# Patient Record
Sex: FEMALE | Race: WHITE | NOT HISPANIC OR LATINO | URBAN - METROPOLITAN AREA
[De-identification: names, ages, dates, MRNs, and addresses within clinical notes are randomized per-mention and may not be internally consistent; named-entity substitution may affect disease eponyms.]

---

## 2019-03-17 ENCOUNTER — OUTPATIENT (OUTPATIENT)
Dept: OUTPATIENT SERVICES | Facility: HOSPITAL | Age: 29
LOS: 1 days | End: 2019-03-17

## 2019-03-17 VITALS — SYSTOLIC BLOOD PRESSURE: 97 MMHG | HEART RATE: 88 BPM | TEMPERATURE: 98 F | DIASTOLIC BLOOD PRESSURE: 56 MMHG

## 2019-03-17 DIAGNOSIS — O26.899 OTHER SPECIFIED PREGNANCY RELATED CONDITIONS, UNSPECIFIED TRIMESTER: ICD-10-CM

## 2019-03-17 DIAGNOSIS — Z3A.00 WEEKS OF GESTATION OF PREGNANCY NOT SPECIFIED: ICD-10-CM

## 2019-03-17 LAB
APPEARANCE UR: CLEAR — SIGNIFICANT CHANGE UP
BACTERIA # UR AUTO: SIGNIFICANT CHANGE UP
BILIRUB UR-MCNC: NEGATIVE — SIGNIFICANT CHANGE UP
BLOOD UR QL VISUAL: NEGATIVE — SIGNIFICANT CHANGE UP
COLOR SPEC: SIGNIFICANT CHANGE UP
GLUCOSE UR-MCNC: NEGATIVE — SIGNIFICANT CHANGE UP
HYALINE CASTS # UR AUTO: NEGATIVE — SIGNIFICANT CHANGE UP
KETONES UR-MCNC: NEGATIVE — SIGNIFICANT CHANGE UP
LEUKOCYTE ESTERASE UR-ACNC: SIGNIFICANT CHANGE UP
NITRITE UR-MCNC: NEGATIVE — SIGNIFICANT CHANGE UP
PH UR: 7 — SIGNIFICANT CHANGE UP (ref 5–8)
PROT UR-MCNC: NEGATIVE — SIGNIFICANT CHANGE UP
RBC CASTS # UR COMP ASSIST: SIGNIFICANT CHANGE UP (ref 0–?)
SP GR SPEC: 1.01 — SIGNIFICANT CHANGE UP (ref 1–1.04)
SQUAMOUS # UR AUTO: SIGNIFICANT CHANGE UP
UROBILINOGEN FLD QL: NORMAL — SIGNIFICANT CHANGE UP
WBC UR QL: HIGH (ref 0–?)

## 2019-03-19 LAB
BACTERIA UR CULT: SIGNIFICANT CHANGE UP
SPECIMEN SOURCE: SIGNIFICANT CHANGE UP

## 2019-08-16 ENCOUNTER — OUTPATIENT (OUTPATIENT)
Dept: INPATIENT UNIT | Facility: HOSPITAL | Age: 29
LOS: 1 days | Discharge: HOME | End: 2019-08-16
Payer: MEDICAID

## 2019-08-16 VITALS — SYSTOLIC BLOOD PRESSURE: 107 MMHG | HEART RATE: 81 BPM | DIASTOLIC BLOOD PRESSURE: 56 MMHG

## 2019-08-16 DIAGNOSIS — Z98.82 BREAST IMPLANT STATUS: Chronic | ICD-10-CM

## 2019-08-16 DIAGNOSIS — Z3A.00 WEEKS OF GESTATION OF PREGNANCY NOT SPECIFIED: ICD-10-CM

## 2019-08-16 DIAGNOSIS — O26.899 OTHER SPECIFIED PREGNANCY RELATED CONDITIONS, UNSPECIFIED TRIMESTER: ICD-10-CM

## 2019-08-16 PROCEDURE — 99213 OFFICE O/P EST LOW 20 MIN: CPT

## 2019-08-16 NOTE — OB PROVIDER TRIAGE NOTE - NSOBPROVIDERNOTE_OBGYN_ALL_OB_FT
d/w Dr. Brown  Discharge pt to walk for 2-3. Return to triage sooner if contractions worsen, water breaks, vaginal bleeding or decreased fetal movement.

## 2019-08-16 NOTE — OB PROVIDER TRIAGE NOTE - NSHPPHYSICALEXAM_GEN_ALL_CORE
Vital Signs Last 24 Hrs  T(C): 36.5 (16 Aug 2019 22:43), Max: 36.5 (16 Aug 2019 22:43)  T(F): 97.7 (16 Aug 2019 22:43), Max: 97.7 (16 Aug 2019 22:43)  HR: 82 (16 Aug 2019 22:43) (82 - 82)  BP: 114/60 (16 Aug 2019 22:43) (114/60 - 114/60)  RR: 18 (16 Aug 2019 22:43) (18 - 18)    SVE 3/80/-3    Category 1 tracing   ctx q 2-6 min Vital Signs Last 24 Hrs  T(C): 36.5 (16 Aug 2019 22:43), Max: 36.5 (16 Aug 2019 22:43)  T(F): 97.7 (16 Aug 2019 22:43), Max: 97.7 (16 Aug 2019 22:43)  HR: 82 (16 Aug 2019 22:43) (82 - 82)  BP: 114/60 (16 Aug 2019 22:43) (114/60 - 114/60)  RR: 18 (16 Aug 2019 22:43) (18 - 18)    SVE 3/80/-3    Category 1 tracing   ctx q 2-6 min    TAUS cephalic presentation, anterior placenta, benedict 8.04 cm, bpp

## 2019-08-16 NOTE — OB PROVIDER TRIAGE NOTE - ADDITIONAL INSTRUCTIONS
Discharge pt to walk for 2-3. Return to triage sooner if contractions worsen, water breaks, vaginal bleeding or decreased fetal movement.

## 2019-08-17 ENCOUNTER — TRANSCRIPTION ENCOUNTER (OUTPATIENT)
Age: 29
End: 2019-08-17

## 2019-08-17 ENCOUNTER — INPATIENT (INPATIENT)
Facility: HOSPITAL | Age: 29
LOS: 1 days | Discharge: HOME | End: 2019-08-19
Attending: OBSTETRICS & GYNECOLOGY | Admitting: OBSTETRICS & GYNECOLOGY
Payer: COMMERCIAL

## 2019-08-17 VITALS
HEART RATE: 73 BPM | DIASTOLIC BLOOD PRESSURE: 61 MMHG | SYSTOLIC BLOOD PRESSURE: 98 MMHG | RESPIRATION RATE: 16 BRPM | OXYGEN SATURATION: 100 % | TEMPERATURE: 98 F

## 2019-08-17 DIAGNOSIS — O41.03X0 OLIGOHYDRAMNIOS, THIRD TRIMESTER, NOT APPLICABLE OR UNSPECIFIED: ICD-10-CM

## 2019-08-17 DIAGNOSIS — Z98.82 BREAST IMPLANT STATUS: Chronic | ICD-10-CM

## 2019-08-17 DIAGNOSIS — O26.899 OTHER SPECIFIED PREGNANCY RELATED CONDITIONS, UNSPECIFIED TRIMESTER: ICD-10-CM

## 2019-08-17 DIAGNOSIS — Z3A.00 WEEKS OF GESTATION OF PREGNANCY NOT SPECIFIED: ICD-10-CM

## 2019-08-17 LAB
BASOPHILS # BLD AUTO: 0.03 K/UL — SIGNIFICANT CHANGE UP (ref 0–0.2)
BASOPHILS NFR BLD AUTO: 0.3 % — SIGNIFICANT CHANGE UP (ref 0–2)
BLD GP AB SCN SERPL QL: NEGATIVE — SIGNIFICANT CHANGE UP
EOSINOPHIL # BLD AUTO: 0.13 K/UL — SIGNIFICANT CHANGE UP (ref 0–0.5)
EOSINOPHIL NFR BLD AUTO: 1.1 % — SIGNIFICANT CHANGE UP (ref 0–6)
HCT VFR BLD CALC: 36.1 % — SIGNIFICANT CHANGE UP (ref 34.5–45)
HGB BLD-MCNC: 11.4 G/DL — LOW (ref 11.5–15.5)
IMM GRANULOCYTES NFR BLD AUTO: 0.7 % — SIGNIFICANT CHANGE UP (ref 0–1.5)
LYMPHOCYTES # BLD AUTO: 1.87 K/UL — SIGNIFICANT CHANGE UP (ref 1–3.3)
LYMPHOCYTES # BLD AUTO: 16.2 % — SIGNIFICANT CHANGE UP (ref 13–44)
MCHC RBC-ENTMCNC: 28.4 PG — SIGNIFICANT CHANGE UP (ref 27–34)
MCHC RBC-ENTMCNC: 31.6 % — LOW (ref 32–36)
MCV RBC AUTO: 90 FL — SIGNIFICANT CHANGE UP (ref 80–100)
MONOCYTES # BLD AUTO: 0.92 K/UL — HIGH (ref 0–0.9)
MONOCYTES NFR BLD AUTO: 8 % — SIGNIFICANT CHANGE UP (ref 2–14)
NEUTROPHILS # BLD AUTO: 8.52 K/UL — HIGH (ref 1.8–7.4)
NEUTROPHILS NFR BLD AUTO: 73.7 % — SIGNIFICANT CHANGE UP (ref 43–77)
NRBC # FLD: 0 K/UL — SIGNIFICANT CHANGE UP (ref 0–0)
PLATELET # BLD AUTO: 184 K/UL — SIGNIFICANT CHANGE UP (ref 150–400)
PMV BLD: 11.9 FL — SIGNIFICANT CHANGE UP (ref 7–13)
RBC # BLD: 4.01 M/UL — SIGNIFICANT CHANGE UP (ref 3.8–5.2)
RBC # FLD: 14.6 % — HIGH (ref 10.3–14.5)
RH IG SCN BLD-IMP: POSITIVE — SIGNIFICANT CHANGE UP
WBC # BLD: 11.55 K/UL — HIGH (ref 3.8–10.5)
WBC # FLD AUTO: 11.55 K/UL — HIGH (ref 3.8–10.5)

## 2019-08-17 PROCEDURE — 59409 OBSTETRICAL CARE: CPT | Mod: U9

## 2019-08-17 RX ORDER — CITRIC ACID/SODIUM CITRATE 300-500 MG
15 SOLUTION, ORAL ORAL EVERY 6 HOURS
Refills: 0 | Status: DISCONTINUED | OUTPATIENT
Start: 2019-08-17 | End: 2019-08-17

## 2019-08-17 RX ORDER — MAGNESIUM HYDROXIDE 400 MG/1
30 TABLET, CHEWABLE ORAL
Refills: 0 | Status: DISCONTINUED | OUTPATIENT
Start: 2019-08-17 | End: 2019-08-19

## 2019-08-17 RX ORDER — ACETAMINOPHEN 500 MG
3 TABLET ORAL
Qty: 0 | Refills: 0 | DISCHARGE
Start: 2019-08-17

## 2019-08-17 RX ORDER — TETANUS TOXOID, REDUCED DIPHTHERIA TOXOID AND ACELLULAR PERTUSSIS VACCINE, ADSORBED 5; 2.5; 8; 8; 2.5 [IU]/.5ML; [IU]/.5ML; UG/.5ML; UG/.5ML; UG/.5ML
0.5 SUSPENSION INTRAMUSCULAR ONCE
Refills: 0 | Status: DISCONTINUED | OUTPATIENT
Start: 2019-08-17 | End: 2019-08-19

## 2019-08-17 RX ORDER — DIBUCAINE 1 %
1 OINTMENT (GRAM) RECTAL EVERY 6 HOURS
Refills: 0 | Status: DISCONTINUED | OUTPATIENT
Start: 2019-08-17 | End: 2019-08-19

## 2019-08-17 RX ORDER — OXYCODONE HYDROCHLORIDE 5 MG/1
5 TABLET ORAL
Refills: 0 | Status: DISCONTINUED | OUTPATIENT
Start: 2019-08-17 | End: 2019-08-19

## 2019-08-17 RX ORDER — SIMETHICONE 80 MG/1
80 TABLET, CHEWABLE ORAL EVERY 4 HOURS
Refills: 0 | Status: DISCONTINUED | OUTPATIENT
Start: 2019-08-17 | End: 2019-08-19

## 2019-08-17 RX ORDER — AER TRAVELER 0.5 G/1
1 SOLUTION RECTAL; TOPICAL EVERY 4 HOURS
Refills: 0 | Status: DISCONTINUED | OUTPATIENT
Start: 2019-08-17 | End: 2019-08-19

## 2019-08-17 RX ORDER — IBUPROFEN 200 MG
1 TABLET ORAL
Qty: 0 | Refills: 0 | DISCHARGE
Start: 2019-08-17

## 2019-08-17 RX ORDER — OXYCODONE HYDROCHLORIDE 5 MG/1
5 TABLET ORAL ONCE
Refills: 0 | Status: DISCONTINUED | OUTPATIENT
Start: 2019-08-17 | End: 2019-08-19

## 2019-08-17 RX ORDER — DOCUSATE SODIUM 100 MG
100 CAPSULE ORAL
Refills: 0 | Status: DISCONTINUED | OUTPATIENT
Start: 2019-08-17 | End: 2019-08-19

## 2019-08-17 RX ORDER — IBUPROFEN 200 MG
600 TABLET ORAL EVERY 6 HOURS
Refills: 0 | Status: COMPLETED | OUTPATIENT
Start: 2019-08-17 | End: 2020-07-15

## 2019-08-17 RX ORDER — SODIUM CHLORIDE 9 MG/ML
3 INJECTION INTRAMUSCULAR; INTRAVENOUS; SUBCUTANEOUS EVERY 8 HOURS
Refills: 0 | Status: DISCONTINUED | OUTPATIENT
Start: 2019-08-17 | End: 2019-08-19

## 2019-08-17 RX ORDER — SODIUM CHLORIDE 9 MG/ML
1000 INJECTION, SOLUTION INTRAVENOUS
Refills: 0 | Status: DISCONTINUED | OUTPATIENT
Start: 2019-08-17 | End: 2019-08-17

## 2019-08-17 RX ORDER — ACETAMINOPHEN 500 MG
975 TABLET ORAL
Refills: 0 | Status: DISCONTINUED | OUTPATIENT
Start: 2019-08-17 | End: 2019-08-19

## 2019-08-17 RX ORDER — HYDROCORTISONE 1 %
1 OINTMENT (GRAM) TOPICAL EVERY 6 HOURS
Refills: 0 | Status: DISCONTINUED | OUTPATIENT
Start: 2019-08-17 | End: 2019-08-19

## 2019-08-17 RX ORDER — OXYTOCIN 10 UNIT/ML
2 VIAL (ML) INJECTION
Qty: 30 | Refills: 0 | Status: DISCONTINUED | OUTPATIENT
Start: 2019-08-17 | End: 2019-08-17

## 2019-08-17 RX ORDER — GLYCERIN ADULT
1 SUPPOSITORY, RECTAL RECTAL AT BEDTIME
Refills: 0 | Status: DISCONTINUED | OUTPATIENT
Start: 2019-08-17 | End: 2019-08-19

## 2019-08-17 RX ORDER — PRAMOXINE HYDROCHLORIDE 150 MG/15G
1 AEROSOL, FOAM RECTAL EVERY 4 HOURS
Refills: 0 | Status: DISCONTINUED | OUTPATIENT
Start: 2019-08-17 | End: 2019-08-19

## 2019-08-17 RX ORDER — BENZOCAINE 10 %
1 GEL (GRAM) MUCOUS MEMBRANE EVERY 6 HOURS
Refills: 0 | Status: DISCONTINUED | OUTPATIENT
Start: 2019-08-17 | End: 2019-08-19

## 2019-08-17 RX ORDER — DIPHENHYDRAMINE HCL 50 MG
25 CAPSULE ORAL EVERY 6 HOURS
Refills: 0 | Status: DISCONTINUED | OUTPATIENT
Start: 2019-08-17 | End: 2019-08-19

## 2019-08-17 RX ORDER — KETOROLAC TROMETHAMINE 30 MG/ML
30 SYRINGE (ML) INJECTION ONCE
Refills: 0 | Status: DISCONTINUED | OUTPATIENT
Start: 2019-08-17 | End: 2019-08-17

## 2019-08-17 RX ORDER — OXYTOCIN 10 UNIT/ML
333.33 VIAL (ML) INJECTION
Qty: 20 | Refills: 0 | Status: DISCONTINUED | OUTPATIENT
Start: 2019-08-17 | End: 2019-08-17

## 2019-08-17 RX ORDER — LANOLIN
1 OINTMENT (GRAM) TOPICAL EVERY 6 HOURS
Refills: 0 | Status: DISCONTINUED | OUTPATIENT
Start: 2019-08-17 | End: 2019-08-19

## 2019-08-17 RX ADMIN — Medication 2 MILLIUNIT(S)/MIN: at 15:14

## 2019-08-17 RX ADMIN — Medication 1000 MILLIUNIT(S)/MIN: at 18:52

## 2019-08-17 RX ADMIN — Medication 2 MILLIUNIT(S)/MIN: at 18:53

## 2019-08-17 NOTE — DISCHARGE NOTE OB - MEDICATION SUMMARY - MEDICATIONS TO TAKE
I will START or STAY ON the medications listed below when I get home from the hospital:    acetaminophen 325 mg oral tablet  -- 3 tab(s) by mouth   -- Indication: For pain    ibuprofen 600 mg oral tablet  -- 1 tab(s) by mouth every 6 hours  -- Indication: For pain I will START or STAY ON the medications listed below when I get home from the hospital:    ibuprofen 600 mg oral tablet  -- 1 tab(s) by mouth every 6 hours, As Needed  -- Indication: For pain    acetaminophen 325 mg oral tablet  -- 3 tab(s) by mouth , As Needed  -- Indication: For pain

## 2019-08-17 NOTE — OB PROVIDER H&P - ASSESSMENT
29 yo , EGA@40.2 weeks came back after walking; patient was evlaauted last night and was discharged with VE: 3 cm.  Prenatal course is uncomplicated.  OB hx: 2 , uncomplicated; biggest baby [id number]  Med hx: denies  Surg hx: 2018, breat augmentation  GYN hx: denies  meds: PNV  Allergy: NKDA  Upon evaluation, Vital Signs: T(C): 36.6 (17 Aug 2019 06:11), HR: 65 (17 Aug 2019 06:26) (65 - 82); BP: 105/61 (17 Aug 2019 06:26) (98/61 - 114/60); RR: 16 (17 Aug 2019 06:11) (16 - 18); SpO2: 100%  category 1 FHTs, irregular contractions patter; SVE: 2-3 cm/50/-2; BPP 2/8, PETTY 2; EFW 3300 g  A: 29 yo , EGA@40.2 weeks, oligohydramnios; category 1 FHTs  P: case was reviewed with Dr Martinez, was advised to admit for vaginal Cytotec; anticipate ; patient can ambulate in room after reactive tracing

## 2019-08-17 NOTE — CHART NOTE - NSCHARTNOTEFT_GEN_A_CORE
R4 OB Note    Vaginal cytotec 25 mcg placed.   SVE 2.5/60/-3  posterior, intermeidate tone.   Category 1 tracing    For pitocin in 4 hours for augmentation if needed.     Continous EFM/[place]    Mandie Gallegos PGy-4  d/w Dr. Martinez

## 2019-08-17 NOTE — DISCHARGE NOTE OB - CARE PROVIDERS DIRECT ADDRESSES
[email or web address]

## 2019-08-17 NOTE — OB PROVIDER DELIVERY SUMMARY - NSPROVIDERDELIVERYNOTE_OBGYN_ALL_OB_FT
Patient fully dilated and pushing  Delivery of liveborn male infant from OA position  Placenta delivered spontaneously and intact  Rectal cytotec 1000mcg given for JAMES atony, resolved with uterine massage  No lacerations noted    EBL 400ml

## 2019-08-17 NOTE — OB PROVIDER H&P - ATTENDING COMMENTS
Reviewed H&P as above. P2 admitted for IOL for oligohydramnios with vaginal cytotec. Approved for epidural.    MARY Sutton MD

## 2019-08-17 NOTE — DISCHARGE NOTE OB - PATIENT PORTAL LINK FT
You can access the [facility] Patient Portal, offered by [facility], by registering with the following website: [email or web address]

## 2019-08-17 NOTE — CHART NOTE - NSCHARTNOTEFT_GEN_A_CORE
OB Attending Progress Note    Patient seen and evaluated at bedside.  Denies complaints.  s/p SROM feeling some pressure with contractions    Vital Signs Last 24 Hrs  T(C): 36.8 (17 Aug 2019 13:34), Max: 36.8 (17 Aug 2019 13:34)  T(F): 98.24 (17 Aug 2019 13:34), Max: 98.24 (17 Aug 2019 13:34)  HR: 86 (17 Aug 2019 17:58) (65 - 103)  BP: 125/62 (17 Aug 2019 17:48) (85/50 - 125/62)  RR: 16 (17 Aug 2019 08:04) (16 - 18)  SpO2: 89% (17 Aug 2019 18:02) (82% - 100%)    SVE: 5/90/-3    EFM: 130, mod farhad, +acels, no decels  [place]:  ctx q 2-3 mins    A/P 35y P2 admitted for IOL for oligohydramnios  -Labor: pitocin at 4 mu/min, continue to titrate, peanut ball  -Fetal Status: reassuring  -GBS: negative  -Analgesia: epidural in place, anesthesia called for top off    N MD Herman

## 2019-08-17 NOTE — CHART NOTE - NSCHARTNOTEFT_GEN_A_CORE
PA Note    patient seen & examined for cont of management. comfortable with epidural    VS  T(C): 36.8 (08-17-19 @ 13:34)  HR: 80 (08-17-19 @ 14:53)  BP: 112/53 (08-17-19 @ 14:48)  RR: 16 (08-17-19 @ 08:04)  SpO2: 99% (08-17-19 @ 14:53)    130/mod farhad/+accels/no decels  [place] q 4-5min  3.5/70/-2    cont efm/toco  pitocin for continued augmentation  dw Dr Martinez, Dr Parsons SVC covering pitocin  ldevmauricio nelson

## 2019-08-17 NOTE — DISCHARGE NOTE OB - MATERIALS PROVIDED
Bottle Feeding Log/[facility] Hearing Screen Program/Birth Certificate Instructions/Discharge Medication Information for Patients and Families Pocket Guide/Breastfeeding Log/Back To Sleep Handout/Shaken Baby Prevention Handout/Guide to Postpartum Care/[facility] [place] Screening Program/[place]  Immunization Record

## 2019-08-17 NOTE — CHART NOTE - NSCHARTNOTEFT_GEN_A_CORE
R1 Progress Note    Pt checked for increased discomfort with ctx. Received vcyto #1 at 10:45a    Vital Signs Last 24 Hrs  T(C): 36.6 (17 Aug 2019 08:04), Max: 36.6 (17 Aug 2019 06:11)  T(F): 97.9 (17 Aug 2019 08:04), Max: 97.9 (17 Aug 2019 06:11)  HR: 75 (17 Aug 2019 10:39) (65 - 82)  BP: 114/57 (17 Aug 2019 10:39) (98/61 - 114/60)  RR: 16 (17 Aug 2019 08:04) (16 - 18)  SpO2: 100% (17 Aug 2019 06:11) (100% - 100%)    /mod/+accel/-decel  [place] q5min  VE 3/70/-3    4epi  VE @1:45pm -> ?pit    D/w Dr. Juan Palmer PGY-1

## 2019-08-17 NOTE — OB PROVIDER TRIAGE NOTE - NSOBPROVIDERNOTE_OBGYN_ALL_OB_FT
29 yo , EGA@40.2 weeks came back after walking; patient was evlaauted last night and was discharged with VE: 3 cm.  Prenatal course is uncomplicated.  OB hx: 2 , uncomplicated; biggest baby [id number]  Med hx: denies  Surg hx: 2018, breat augmentation  GYN hx: denies  meds: PNV  Allergy: NKDA  Upon evaluation, Vital Signs: T(C): 36.6 (17 Aug 2019 06:11), HR: 65 (17 Aug 2019 06:26) (65 - 82); BP: 105/61 (17 Aug 2019 06:26) (98/61 - 114/60); RR: 16 (17 Aug 2019 06:11) (16 - 18); SpO2: 100%  category 1 FHTs, irregular contractions patter; SVE: 2-3 cm/50/-2; BPP 8/8; EFW 3300 g  A: 29 yo , EGA@40.2 weeks, early labor vs false labor; category 1 FHTs  P: case was reviewed with Dr Martinez, was advised to discharge patient home with labor precautions, fetal movements count; if not in labor, to follow up with Dr Kwan on 2019. 29 yo , EGA@40.2 weeks came back after walking; patient was evlaauted last night and was discharged with VE: 3 cm.  Prenatal course is uncomplicated.  OB hx: 2 , uncomplicated; biggest baby [id number]  Med hx: denies  Surg hx: 2018, breat augmentation  GYN hx: denies  meds: PNV  Allergy: NKDA  Upon evaluation, Vital Signs: T(C): 36.6 (17 Aug 2019 06:11), HR: 65 (17 Aug 2019 06:26) (65 - 82); BP: 105/61 (17 Aug 2019 06:26) (98/61 - 114/60); RR: 16 (17 Aug 2019 06:11) (16 - 18); SpO2: 100%  category 1 FHTs, irregular contractions patter; SVE: 2-3 cm/50/-2; BPP 2/8, PETTY 2; EFW 3300 g  A: 29 yo , EGA@40.2 weeks, oligohydramnios; category 1 FHTs  P: case was reviewed with Dr Martinez, was advised to admit for vaginal Cytotec; anticipate ; patient can ambulate in room after reactive tracing

## 2019-08-17 NOTE — DISCHARGE NOTE OB - CARE PROVIDER_API CALL
Cristobal Kwan)  MaternalFetal Medicine; Obstetrics and Gynecology  [address] [address] [place] [address], [address]  [place] [address]  Phone: [phone]  Fax: [phone]  Follow Up Time:

## 2019-08-18 LAB — T PALLIDUM AB TITR SER: NEGATIVE — SIGNIFICANT CHANGE UP

## 2019-08-18 RX ORDER — IBUPROFEN 200 MG
600 TABLET ORAL EVERY 6 HOURS
Refills: 0 | Status: DISCONTINUED | OUTPATIENT
Start: 2019-08-18 | End: 2019-08-19

## 2019-08-18 RX ADMIN — Medication 600 MILLIGRAM(S): at 05:36

## 2019-08-18 RX ADMIN — SODIUM CHLORIDE 3 MILLILITER(S): 9 INJECTION INTRAMUSCULAR; INTRAVENOUS; SUBCUTANEOUS at 05:36

## 2019-08-18 RX ADMIN — SODIUM CHLORIDE 3 MILLILITER(S): 9 INJECTION INTRAMUSCULAR; INTRAVENOUS; SUBCUTANEOUS at 16:10

## 2019-08-18 NOTE — PROGRESS NOTE ADULT - SUBJECTIVE AND OBJECTIVE BOX
S: Patient doing well. No complaints. Minimal lochia. Pain controlled.    O: Vital Signs Last 24 Hrs  T(C): 36.7 (18 Aug 2019 05:30), Max: 37.1 (17 Aug 2019 18:45)  T(F): 98.1 (18 Aug 2019 05:30), Max: 98.8 (17 Aug 2019 18:45)  HR: 68 (18 Aug 2019 05:30) (68 - 116)  BP: 90/50 (18 Aug 2019 05:30) (85/50 - 139/64)  BP(mean): --  RR: 16 (18 Aug 2019 05:30) (16 - 16)  SpO2: 99% (18 Aug 2019 05:30) (79% - 100%)    Gen: NAD  Abd: soft, Nontender, Nondistended, fundus firm  Ext: no tendern, mild edema    Labs:                        11.4   11.55 )-----------( 184      ( 17 Aug 2019 07:58 )             36.1       A: 28y PPD# s/p  doing well.    Plan:  Routine postpartum care  Encouraged out of bed  Regular diet

## 2019-08-18 NOTE — PROGRESS NOTE ADULT - SUBJECTIVE AND OBJECTIVE BOX
post epidural d/c followup:    pt states freely voiding and ambulating.  Denies headeache/nausea/vomiting.  VSS.  States no concerns at this time      ICU Vital Signs Last 24 Hrs  T(C): 36.7 (18 Aug 2019 05:30), Max: 37.1 (17 Aug 2019 18:45)  T(F): 98.1 (18 Aug 2019 05:30), Max: 98.8 (17 Aug 2019 18:45)  HR: 68 (18 Aug 2019 05:30) (68 - 116)  BP: 90/50 (18 Aug 2019 05:30) (85/50 - 139/64)  BP(mean): --  ABP: --  ABP(mean): --  RR: 16 (18 Aug 2019 05:30) (16 - 16)  SpO2: 99% (18 Aug 2019 05:30) (79% - 100%)

## 2019-08-19 VITALS
OXYGEN SATURATION: 100 % | HEART RATE: 63 BPM | DIASTOLIC BLOOD PRESSURE: 61 MMHG | SYSTOLIC BLOOD PRESSURE: 104 MMHG | TEMPERATURE: 98 F | RESPIRATION RATE: 18 BRPM

## 2019-10-10 ENCOUNTER — APPOINTMENT (OUTPATIENT)
Dept: OBGYN | Facility: CLINIC | Age: 29
End: 2019-10-10

## 2021-02-05 NOTE — DISCHARGE NOTE OB - CARE PLAN
Class II - visualization of the soft palate, fauces, and uvula Principal Discharge DX:	 (normal spontaneous vaginal delivery)  Goal:	Home  Assessment and plan of treatment:	routine post partum care

## 2022-11-23 ENCOUNTER — NON-APPOINTMENT (OUTPATIENT)
Age: 32
End: 2022-11-23

## 2023-03-11 NOTE — OB PROVIDER TRIAGE NOTE - NSPRENATALCARE_OBGYN_ALL_OB
[Disease:__________________________] : Disease: [unfilled] [de-identified] : Ms. Langford is a 66 yo woman with newly diagnosed nodular lymphocyte predominant Hodgkin lymphoma. Around mid May, 2022, her dentist\par noticed a painless left neck lump. U/S 8/2022 showed a 2.3 cm mainly cystic mass inferior the the left parotid gland.\par CT of neck w/contrast on 9/13/22 demonstrated the mass to measure 2.2 cm, and was lobulated. There were several \par suspicious level IIa cervical nodes seen as well an indeterminate 16 mm node at that level. Nonspecific enlargement of\par palatine tonsils was seen, likely reactive. An FNA, then a core biopsy of the infraparotid node were non-diagnostic, \par with lymphoid atypia noted in the FNA. The mass was excised on 2/24/23, and it showed nodular, lymphocyte predominant HL, \par Neoplastic tumor cells expressed CD20, PAX5, CD79a, OCT-2, BENITEZ-1, BCL6, bright MUM1, CD45, BCL2, partial C-MYC and were\par (-) for CD15, BREE and p53. Polytypic plasma cells were seen. Focal areas of small groupings of large B cells (+) for \par CD30 and BREE were seen. Monoclonal B cell population was not identified by flow.\par She has no constitutional c/o.\par She has a h/o T3N0 colon cancer resected in 2018 at [facility] (Dr. IAN Mathis). She did not require adjuvant therapy. \par She had CT colography 5/2022 which was done b/o incomplete colonoscopy: this showed no LAD or H/S megaly; GGOswere seen in the\par lung bases c/w Covid infection. 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 INA Marquez), and had several yrs of negative brain imaging after surgery. 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 Mathis \par \par \par  [de-identified] : pending Yes

## 2023-04-13 NOTE — OB PROVIDER TRIAGE NOTE - NS_SONONOTE_OBGYN_ALL_OB_FT
Discharge Instruction for Undelivered Patients      You were seen for:  Pelvic Pain  We Consulted: Dr. Mendez  You had (Test or Medicine): NST, UA/UC, Wet prep     Diet:   Drink 8 to 12 glasses of liquids (milk, juice, water) every day.  You may eat meals and snacks.     Activity:  Rest the pelvic area. No sex. Do not stimulate breasts or nipples.  Stay on bed rest or partial bed rest. This means taking it easy and rest pelvis.    Stay off work rest of week.     Call your provider if you notice:  Swelling in your face or increased swelling in your hands or legs.  Headaches that are not relieved by Tylenol (acetaminophen).  Changes in your vision (blurring: seeing spots or stars.)  Nausea (sick to your stomach) and vomiting (throwing up).   Weight gain of 5 pounds or more per week.  Heartburn that doesn't go away.  Signs of bladder infection: pain when you urinate (use the toilet), need to go more often and more urgently.  The bag of caraballo (rupture of membranes) breaks, or you notice leaking in your underwear.  Bright red blood in your underwear.  Abdominal (lower belly) or stomach pain.  For first baby: Contractions (tightening) less than 5 minutes apart for one hour or more.  Increase or change in vaginal discharge (note the color and amount)      Follow-up:  As scheduled in the clinic on friday          
bpp 8/8

## 2024-05-10 NOTE — OB PROVIDER TRIAGE NOTE - NSWEIGHT2_OBGYN_ALL_OB_NU
Placed call to pt no answer. Left message  on pt vm to call back regarding her px on Tuesday 5/14/24. Telephone number provided.    [phone]

## 2024-09-19 ENCOUNTER — EMERGENCY (EMERGENCY)
Facility: HOSPITAL | Age: 34
LOS: 1 days | Discharge: HOME | End: 2024-09-19
Attending: STUDENT IN AN ORGANIZED HEALTH CARE EDUCATION/TRAINING PROGRAM
Payer: MEDICAID

## 2024-09-19 ENCOUNTER — TRANSCRIPTION ENCOUNTER (OUTPATIENT)
Age: 34
End: 2024-09-19

## 2024-09-19 VITALS
WEIGHT: 100.09 LBS | RESPIRATION RATE: 18 BRPM | TEMPERATURE: 98 F | HEIGHT: 62 IN | OXYGEN SATURATION: 100 % | DIASTOLIC BLOOD PRESSURE: 65 MMHG | HEART RATE: 84 BPM | SYSTOLIC BLOOD PRESSURE: 107 MMHG

## 2024-09-19 DIAGNOSIS — Z98.82 BREAST IMPLANT STATUS: Chronic | ICD-10-CM

## 2024-09-19 PROCEDURE — 99283 EMERGENCY DEPT VISIT LOW MDM: CPT

## 2024-09-19 PROCEDURE — 99284 EMERGENCY DEPT VISIT MOD MDM: CPT

## 2024-09-19 RX ORDER — ONDANSETRON 2 MG/ML
4 INJECTION, SOLUTION INTRAMUSCULAR; INTRAVENOUS ONCE
Refills: 0 | Status: COMPLETED | OUTPATIENT
Start: 2024-09-19 | End: 2024-09-19

## 2024-09-19 RX ORDER — METOCLOPRAMIDE HCL 5 MG
1 TABLET ORAL
Qty: 28 | Refills: 0
Start: 2024-09-19 | End: 2024-09-25

## 2024-09-19 RX ORDER — SUCRALFATE 1 G/10ML
1 SUSPENSION ORAL ONCE
Refills: 0 | Status: DISCONTINUED | OUTPATIENT
Start: 2024-09-19 | End: 2024-09-19

## 2024-09-19 RX ORDER — METOCLOPRAMIDE HCL 5 MG
10 TABLET ORAL ONCE
Refills: 0 | Status: COMPLETED | OUTPATIENT
Start: 2024-09-19 | End: 2024-09-19

## 2024-09-19 RX ORDER — ONDANSETRON 2 MG/ML
1 INJECTION, SOLUTION INTRAMUSCULAR; INTRAVENOUS
Qty: 28 | Refills: 0
Start: 2024-09-19 | End: 2024-09-25

## 2024-09-19 RX ADMIN — ONDANSETRON 4 MILLIGRAM(S): 2 INJECTION, SOLUTION INTRAMUSCULAR; INTRAVENOUS at 17:41

## 2024-09-19 RX ADMIN — Medication 10 MILLIGRAM(S): at 17:34

## 2024-09-19 NOTE — ED PROVIDER NOTE - CLINICAL SUMMARY MEDICAL DECISION MAKING FREE TEXT BOX
33F presenting with elective pregnancy termination. informed patient we are unable to provide her this service. will give resources and nausea medication.

## 2024-09-19 NOTE — ED PROVIDER NOTE - PATIENT PORTAL LINK FT
You can access the FollowMyHealth Patient Portal offered by [facility] by registering at the following website: [email or web address]. By joining [employer]’s FollowMyHealth portal, you will also be able to view your health information using other applications (apps) compatible with our system.

## 2024-09-19 NOTE — ED ADULT NURSE NOTE - OBJECTIVE STATEMENT
Pt came to ed c/o positive pregnancy test at home. LMP 9/5, Requesting termination. Denies pain, bleeding.

## 2024-09-19 NOTE — ED PROVIDER NOTE - NSFOLLOWUPINSTRUCTIONS_ED_ALL_ED_FT
[facility]ne[facility] at [place]  [address], [address] [address]  Phone: [phone]

## 2024-12-30 ENCOUNTER — NON-APPOINTMENT (OUTPATIENT)
Age: 34
End: 2024-12-30